# Patient Record
Sex: MALE | URBAN - METROPOLITAN AREA
[De-identification: names, ages, dates, MRNs, and addresses within clinical notes are randomized per-mention and may not be internally consistent; named-entity substitution may affect disease eponyms.]

---

## 2024-08-06 ENCOUNTER — OFFICE VISIT (OUTPATIENT)
Dept: URBAN - METROPOLITAN AREA CLINIC 115 | Facility: CLINIC | Age: 39
End: 2024-08-06
Payer: COMMERCIAL

## 2024-08-06 ENCOUNTER — DASHBOARD ENCOUNTERS (OUTPATIENT)
Age: 39
End: 2024-08-06

## 2024-08-06 ENCOUNTER — LAB OUTSIDE AN ENCOUNTER (OUTPATIENT)
Dept: URBAN - METROPOLITAN AREA CLINIC 115 | Facility: CLINIC | Age: 39
End: 2024-08-06

## 2024-08-06 VITALS
HEART RATE: 73 BPM | TEMPERATURE: 98.4 F | HEIGHT: 76 IN | WEIGHT: 235 LBS | DIASTOLIC BLOOD PRESSURE: 76 MMHG | SYSTOLIC BLOOD PRESSURE: 129 MMHG | BODY MASS INDEX: 28.62 KG/M2

## 2024-08-06 DIAGNOSIS — K59.09 CHRONIC CONSTIPATION: ICD-10-CM

## 2024-08-06 DIAGNOSIS — R10.30 LOWER ABDOMINAL PAIN: ICD-10-CM

## 2024-08-06 DIAGNOSIS — K62.5 RECTAL BLEEDING: ICD-10-CM

## 2024-08-06 PROCEDURE — 99204 OFFICE O/P NEW MOD 45 MIN: CPT | Performed by: STUDENT IN AN ORGANIZED HEALTH CARE EDUCATION/TRAINING PROGRAM

## 2024-08-06 RX ORDER — LISDEXAMFETAMINE DIMESYLATE 70 MG/1
1 CAPSULE IN THE MORNING CAPSULE ORAL ONCE A DAY
Status: ACTIVE | COMMUNITY

## 2024-08-06 RX ORDER — BUPROPION HYDROCHLORIDE 300 MG/1
1 TABLET IN THE MORNING TABLET ORAL ONCE A DAY
Status: ACTIVE | COMMUNITY

## 2024-08-06 RX ORDER — PSYLLIUM HUSK 0.4 G
2 CAPSULES WITH 8 OUNCES OF LIQUID CAPSULE ORAL THREE TIMES A DAY
Status: ACTIVE | COMMUNITY

## 2024-08-06 NOTE — HPI-TODAY'S VISIT:
This is a 39-year-old man presenting today to discuss multiple GI complaints.  The patient reports he has functional dyspepsia which is treated with supplemental iron in the past.  He reports he also has occasional GERD.  He reports that his main symptoms however are lower GI symptoms with occasional constipation and diarrhea.  He reports he has constipation more than diarrhea he has symptoms a day or 2 that he does not use the bathroom followed by loose stools.  He reports that occasionally has blood in stool and also mucus.  He reports that he has had an external hemorrhoid that has bothered him from time to time.  The patient is currently not taking any laxative therapy or any other medications.  Reports he has taken some over-the-counter Pepcid in the past.  Family history of ovarian cancer breast cancer and some sort of gastrointestinal cancer in various family members.

## 2024-08-07 ENCOUNTER — TELEPHONE ENCOUNTER (OUTPATIENT)
Dept: URBAN - METROPOLITAN AREA CLINIC 82 | Facility: CLINIC | Age: 39
End: 2024-08-07

## 2024-08-09 ENCOUNTER — LAB OUTSIDE AN ENCOUNTER (OUTPATIENT)
Dept: URBAN - METROPOLITAN AREA CLINIC 82 | Facility: CLINIC | Age: 39
End: 2024-08-09

## 2024-08-09 LAB
BASO (ABSOLUTE): 0
BASOS: 1
EOS (ABSOLUTE): 0.1
EOS: 2
FERRITIN, SERUM: 73
HEMATOCRIT: 39.5
HEMATOLOGY COMMENTS:: (no result)
HEMOGLOBIN: 12.6
IMMATURE CELLS: (no result)
IMMATURE GRANS (ABS): 0
IMMATURE GRANULOCYTES: 0
IRON BIND.CAP.(TIBC): 307
IRON SATURATION: 22
IRON: 69
LYMPHS (ABSOLUTE): 1.3
LYMPHS: 42
MCH: 30.4
MCHC: 31.9
MCV: 95
MONOCYTES(ABSOLUTE): 0.4
MONOCYTES: 12
NEUTROPHILS (ABSOLUTE): 1.4
NEUTROPHILS: 43
NRBC: (no result)
PLATELETS: 216
RBC: 4.14
RDW: 12.8
UIBC: 238
WBC: 3.1

## 2024-08-11 LAB — CALPROTECTIN, STOOL - QDX: (no result)

## 2024-08-21 ENCOUNTER — OFFICE VISIT (OUTPATIENT)
Dept: URBAN - METROPOLITAN AREA MEDICAL CENTER 24 | Facility: MEDICAL CENTER | Age: 39
End: 2024-08-21

## 2024-10-03 ENCOUNTER — OFFICE VISIT (OUTPATIENT)
Dept: URBAN - METROPOLITAN AREA MEDICAL CENTER 24 | Facility: MEDICAL CENTER | Age: 39
End: 2024-10-03

## 2024-10-04 ENCOUNTER — TELEPHONE ENCOUNTER (OUTPATIENT)
Dept: URBAN - METROPOLITAN AREA MEDICAL CENTER 28 | Facility: MEDICAL CENTER | Age: 39
End: 2024-10-04

## 2024-10-17 ENCOUNTER — OFFICE VISIT (OUTPATIENT)
Dept: URBAN - METROPOLITAN AREA MEDICAL CENTER 24 | Facility: MEDICAL CENTER | Age: 39
End: 2024-10-17